# Patient Record
Sex: FEMALE | Race: WHITE | NOT HISPANIC OR LATINO | Employment: UNEMPLOYED | URBAN - METROPOLITAN AREA
[De-identification: names, ages, dates, MRNs, and addresses within clinical notes are randomized per-mention and may not be internally consistent; named-entity substitution may affect disease eponyms.]

---

## 2017-04-21 ENCOUNTER — HOSPITAL ENCOUNTER (OUTPATIENT)
Dept: RADIOLOGY | Facility: CLINIC | Age: 47
Discharge: HOME | End: 2017-04-21
Payer: COMMERCIAL

## 2017-04-21 ENCOUNTER — TRANSCRIBE ORDERS (OUTPATIENT)
Dept: RADIOLOGY | Facility: CLINIC | Age: 47
End: 2017-04-21

## 2017-04-21 DIAGNOSIS — M25.562 LEFT KNEE PAIN, UNSPECIFIED CHRONICITY: Primary | ICD-10-CM

## 2017-04-21 PROCEDURE — 73564 X-RAY EXAM KNEE 4 OR MORE: CPT

## 2017-04-28 ENCOUNTER — HOSPITAL ENCOUNTER (EMERGENCY)
Facility: HOSPITAL | Age: 47
Discharge: HOME | End: 2017-04-28
Attending: EMERGENCY MEDICINE | Admitting: EMERGENCY MEDICINE
Payer: COMMERCIAL

## 2017-04-28 VITALS
DIASTOLIC BLOOD PRESSURE: 83 MMHG | OXYGEN SATURATION: 98 % | BODY MASS INDEX: 27.32 KG/M2 | TEMPERATURE: 99.1 F | WEIGHT: 170 LBS | SYSTOLIC BLOOD PRESSURE: 140 MMHG | RESPIRATION RATE: 20 BRPM | HEIGHT: 66 IN | HEART RATE: 90 BPM

## 2017-04-28 DIAGNOSIS — M23.92 INTERNAL DERANGEMENT OF KNEE, LEFT: Primary | ICD-10-CM

## 2017-04-28 PROCEDURE — 99283 EMERGENCY DEPT VISIT LOW MDM: CPT

## 2017-04-28 RX ORDER — HYDROCODONE BITARTRATE AND ACETAMINOPHEN 5; 325 MG/1; MG/1
1 TABLET ORAL EVERY 6 HOURS PRN
Qty: 20 TABLET | Refills: 0 | Status: SHIPPED | OUTPATIENT
Start: 2017-04-28

## 2017-05-02 ENCOUNTER — HOSPITAL ENCOUNTER (OUTPATIENT)
Dept: MRI IMAGING | Facility: HOSPITAL | Age: 47
Discharge: HOME | End: 2017-05-02
Payer: COMMERCIAL

## 2017-05-02 DIAGNOSIS — M25.562 LEFT KNEE PAIN, UNSPECIFIED CHRONICITY: ICD-10-CM

## 2017-05-02 PROCEDURE — 73721 MRI JNT OF LWR EXTRE W/O DYE: CPT

## 2018-07-20 ENCOUNTER — TRANSCRIBE ORDERS (OUTPATIENT)
Dept: ADMINISTRATIVE | Facility: HOSPITAL | Age: 48
End: 2018-07-20

## 2018-07-20 DIAGNOSIS — Z12.39 SCREENING BREAST EXAMINATION: Primary | ICD-10-CM

## 2018-07-24 ENCOUNTER — HOSPITAL ENCOUNTER (OUTPATIENT)
Dept: BONE DENSITY | Facility: IMAGING CENTER | Age: 48
Discharge: HOME | End: 2018-07-24
Payer: COMMERCIAL

## 2018-07-24 DIAGNOSIS — Z12.39 SCREENING BREAST EXAMINATION: ICD-10-CM

## 2018-07-24 PROCEDURE — 77067 SCR MAMMO BI INCL CAD: CPT

## 2019-07-09 ENCOUNTER — TRANSCRIBE ORDERS (OUTPATIENT)
Dept: ADMINISTRATIVE | Facility: HOSPITAL | Age: 49
End: 2019-07-09

## 2019-07-09 DIAGNOSIS — Z12.39 BREAST SCREENING: Primary | ICD-10-CM

## 2019-09-16 ENCOUNTER — HOSPITAL ENCOUNTER (OUTPATIENT)
Dept: BONE DENSITY | Facility: IMAGING CENTER | Age: 49
Discharge: HOME | End: 2019-09-16
Payer: COMMERCIAL

## 2019-09-16 VITALS — WEIGHT: 172 LBS | BODY MASS INDEX: 27 KG/M2 | HEIGHT: 67 IN

## 2019-09-16 DIAGNOSIS — Z12.39 BREAST SCREENING: ICD-10-CM

## 2019-09-16 PROCEDURE — 77067 SCR MAMMO BI INCL CAD: CPT

## 2019-09-16 PROCEDURE — 77063 BREAST TOMOSYNTHESIS BI: CPT

## 2021-07-21 ENCOUNTER — TELEPHONE (OUTPATIENT)
Dept: OBGYN CLINIC | Facility: CLINIC | Age: 51
End: 2021-07-21

## 2022-08-10 ENCOUNTER — HOSPITAL ENCOUNTER (OUTPATIENT)
Dept: MAMMOGRAPHY | Facility: IMAGING CENTER | Age: 52
Discharge: HOME | End: 2022-08-10
Payer: COMMERCIAL

## 2022-08-10 VITALS — WEIGHT: 178 LBS | HEIGHT: 67 IN | BODY MASS INDEX: 27.94 KG/M2

## 2022-08-10 DIAGNOSIS — Z12.31 ENCOUNTER FOR SCREENING MAMMOGRAM FOR MALIGNANT NEOPLASM OF BREAST: ICD-10-CM

## 2022-08-10 PROCEDURE — 77067 SCR MAMMO BI INCL CAD: CPT

## 2022-08-10 PROCEDURE — 77063 BREAST TOMOSYNTHESIS BI: CPT

## 2024-02-26 ENCOUNTER — EVALUATION (OUTPATIENT)
Dept: PHYSICAL THERAPY | Facility: CLINIC | Age: 54
End: 2024-02-26
Payer: OTHER MISCELLANEOUS

## 2024-02-26 DIAGNOSIS — G89.29 CHRONIC LEFT SHOULDER PAIN: Primary | ICD-10-CM

## 2024-02-26 DIAGNOSIS — M25.512 CHRONIC LEFT SHOULDER PAIN: Primary | ICD-10-CM

## 2024-02-26 PROCEDURE — 97161 PT EVAL LOW COMPLEX 20 MIN: CPT | Performed by: PHYSICAL THERAPIST

## 2024-02-26 PROCEDURE — 97530 THERAPEUTIC ACTIVITIES: CPT | Performed by: PHYSICAL THERAPIST

## 2024-02-26 PROCEDURE — 97112 NEUROMUSCULAR REEDUCATION: CPT | Performed by: PHYSICAL THERAPIST

## 2024-02-26 NOTE — PROGRESS NOTES
PT Evaluation     Today's date: 2024  Patient name: Claude Priest  : 1970  MRN: [id number]  Referring provider: Johnson Jason MD  Dx:   Encounter Diagnosis     ICD-10-CM    1. Chronic left shoulder pain  M25.512     G89.29                      Assessment  Assessment details: Claude Priest is a 54 y.o. female presenting to outpatient physical therapy at [facility] with complaints of L shoulder pain, stiffness and weakness.  She presents with decreased L shoulder range of motion, decreased strength, limited flexibility, poor postural awareness, poor body mechanics, decreased tolerance to activity and decreased functional mobility due to Chronic left shoulder pain  (primary encounter diagnosis).  She would benefit from skilled PT services in order to address these deficits and reach maximum level of function.  Thank you for the referral!    Impairments: abnormal or restricted ROM, activity intolerance, impaired physical strength and pain with function    Symptom irritability: moderateUnderstanding of Dx/Px/POC: good   Prognosis: good    Goals  STG  1. Independent with HEP in 2 weeks  2. Decrease pain at worst by 50% in 2 weeks    LTG  1. Increase L shoulder strength in all planes to 5/5 in 4 weeks  2. 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   Patient Goals  Patient goals for therapy: return to work, independence with ADLs/IADLs, increased strength, increased motion and decreased  "pain    Pain  Current pain ratin  At best pain ratin  At worst pain ratin  Location: L lateral delt  Quality: dull ache and sharp  Relieving factors: ice  Aggravating factors: overhead activity and lifting  Progression: worsening      Diagnostic Tests  X-ray: abnormal  MRI studies: abnormal      Objective     Active Range of Motion   Left Shoulder   Flexion: 135 degrees with pain  Extension: 35 degrees with pain  Abduction: 160 degrees   External rotation 0°: 40 degrees with pain  External rotation BTH: C7   Internal rotation BTB: T12 with pain    Right Shoulder   Normal active range of motion    Strength/Myotome Testing     Left Shoulder     Planes of Motion   Flexion: 4+   Extension: 4+   Abduction: 4+   External rotation at 45°: 4   Internal rotation at 0°: 4+   Horizontal abduction: 4-     Isolated Muscles   Lower trapezius: 4-   Middle trapezius: 4-     Right Shoulder   Normal muscle strength    Tests     Left Shoulder   Positive Hawkin's and passive horizontal adduction.          Precautions: L shoulder RTC surgery will be scheduled for                                                                 Neuro Re-Ed             Scapular retraction iso 10x10\"            Mid row iso 30x 5\" green TB            Shld ext iso 30x 5\" green TB            Standing Trio              Sidelying Trio                                       Ther Ex             UBE W/U                                                                                                        Ther Activity             Pt education: pathoanatomy, nature of sxs, POC, HEP NS 8'                         Gait Training                                       Modalities                                            "

## 2024-02-26 NOTE — LETTER
2024    Johnson Jason MD  [address] [place] [id number]    Patient: Claude Priest   YOB: 1970   Date of Visit: 2024     Encounter Diagnosis     ICD-10-CM    1. Chronic left shoulder pain  M25.512     G89.29           Dear Dr. Jason:    Thank you for your recent referral of Claude Priest. Please review the attached evaluation summary from Claude's recent visit.     Please verify that you agree with the plan of care by signing the attached order.     If you have any questions or concerns, please do not hesitate to call.     I sincerely appreciate the opportunity to share in the care of one of your patients and hope to have another opportunity to work with you in the near future.       Sincerely,    Saravanan Jaramillo, PT      Referring Provider:      I certify that I have read the below Plan of Care and certify the need for these services furnished under this plan of treatment while under my care.                    Johnson Jason MD  [address] [place] [address]  Via Fax: [phone]          PT Evaluation     Today's date: 2024  Patient name: Claude Priest  : 1970  MRN: [id number]  Referring provider: Johnson Jason MD  Dx:   Encounter Diagnosis     ICD-10-CM    1. Chronic left shoulder pain  M25.512     G89.29                      Assessment  Assessment details: Claude Priest is a 54 y.o. female presenting to outpatient physical therapy at [facility] with complaints of L shoulder pain, stiffness and weakness.  She presents with decreased L shoulder range of motion, decreased strength, limited flexibility, poor postural awareness, poor body mechanics, decreased tolerance to activity and decreased functional mobility due to Chronic left shoulder pain  (primary encounter diagnosis).  She would benefit from skilled PT services in order to address these deficits and reach maximum level of function.  Thank you for the  referral!    Impairments: abnormal or restricted ROM, activity intolerance, impaired physical strength and pain with function    Symptom irritability: moderateUnderstanding of Dx/Px/POC: good   Prognosis: good    Goals  STG  1. Independent with HEP in 2 weeks  2. Decrease pain at worst by 50% in 2 weeks    LTG  1. Increase L shoulder strength in all planes to 5/5 in 4 weeks  2. 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   Patient Goals  Patient goals for therapy: return to work, independence with ADLs/IADLs, increased strength, increased motion and decreased pain    Pain  Current pain ratin  At best pain ratin  At worst pain ratin  Location: L lateral delt  Quality: dull ache and sharp  Relieving factors: ice  Aggravating factors: overhead activity and lifting  Progression: worsening      Diagnostic Tests  X-ray: abnormal  MRI studies: abnormal      Objective     Active Range of Motion   Left Shoulder   Flexion: 135 degrees with pain  Extension: 35 degrees with pain  Abduction: 160 degrees   External rotation 0°: 40 degrees with pain  External rotation BTH: C7   Internal rotation BTB: T12 with pain    Right Shoulder   Normal active range of motion    Strength/Myotome Testing     Left Shoulder     Planes of Motion   Flexion: 4+   Extension: 4+   Abduction: 4+   External rotation at 45°: 4   Internal rotation at 0°: 4+   Horizontal abduction: 4-     Isolated Muscles   Lower trapezius: 4-   Middle trapezius:  "4-     Right Shoulder   Normal muscle strength    Tests     Left Shoulder   Positive Hawkin's and passive horizontal adduction.          Precautions: L shoulder RTC surgery will be scheduled for April Manuals 2/26                                                                Neuro Re-Ed             Scapular retraction iso 10x10\"            Mid row iso 30x 5\" green TB            Shld ext iso 30x 5\" green TB            Standing Trio              Sidelying Trio                                       Ther Ex             UBE W/U                                                                                                        Ther Activity             Pt education: pathoanatomy, nature of sxs, POC, HEP NS 8'                         Gait Training                                       Modalities                                                             "

## 2024-03-11 ENCOUNTER — OFFICE VISIT (OUTPATIENT)
Dept: PHYSICAL THERAPY | Facility: CLINIC | Age: 54
End: 2024-03-11
Payer: OTHER MISCELLANEOUS

## 2024-03-11 DIAGNOSIS — M25.512 CHRONIC LEFT SHOULDER PAIN: Primary | ICD-10-CM

## 2024-03-11 DIAGNOSIS — G89.29 CHRONIC LEFT SHOULDER PAIN: Primary | ICD-10-CM

## 2024-03-11 PROCEDURE — 97110 THERAPEUTIC EXERCISES: CPT

## 2024-03-11 PROCEDURE — 97112 NEUROMUSCULAR REEDUCATION: CPT

## 2024-03-11 NOTE — PROGRESS NOTES
"Daily Note     Today's date: 3/11/2024  Patient name: Claude Priest  : 1970  MRN: [id number]  Referring provider: Johnson Jason MD  Dx:   Encounter Diagnosis     ICD-10-CM    1. Chronic left shoulder pain  M25.512     G89.29           Start Time: 1700  Stop Time: 1740  Total time in clinic (min): 40 minutes    Subjective: Patient brought in her CD with MRI images on them for PT to look at today. No new complaints.      Objective: See treatment diary below      Assessment: Tolerated treatment well.  Session focused on preparation for surgery in April. Patient did well with additional TE's today without an increase to symptoms. Patient has some min/tolerable discomfort to anterior shoulder during standing and supine flexion .Trial thoracic stretch however had min relief as noted by pt. Pt advised to download MRI results and email to primary PT. Patient was fatigued post treatment and would benefit from continued PT to improve ROM, strength and flexibility to optimize return to prior functional mobility.        Plan: Continue per plan of care.      Precautions: L shoulder RTC surgery will be scheduled for April       Manuals 2/26 3/11                                                               Neuro Re-Ed             Scapular retraction iso 10x10\" 10\" x10           Mid row iso 30x 5\" green TB 30x 5\" green  TB           Shld ext iso 30x 5\" green TB 30x 5\" green TB           Standing Trio   20x ea           Sidelying Trio  20x ea                          ABC supine  1x                                     Ther Ex             UBE W/U  3/3           UT/LV Stretch   20\"x3           Thoracic stretch     3\"x10                                                                            Ther Activity             Pt education: pathoanatomy, nature of sxs, POC, HEP NS 8'                         Gait Training                                       Modalities                                            "

## 2024-03-27 ENCOUNTER — OFFICE VISIT (OUTPATIENT)
Dept: PHYSICAL THERAPY | Facility: CLINIC | Age: 54
End: 2024-03-27
Payer: OTHER MISCELLANEOUS

## 2024-03-27 DIAGNOSIS — M25.512 CHRONIC LEFT SHOULDER PAIN: Primary | ICD-10-CM

## 2024-03-27 DIAGNOSIS — G89.29 CHRONIC LEFT SHOULDER PAIN: Primary | ICD-10-CM

## 2024-03-27 PROCEDURE — 97112 NEUROMUSCULAR REEDUCATION: CPT

## 2024-03-27 PROCEDURE — 97110 THERAPEUTIC EXERCISES: CPT

## 2024-03-27 NOTE — PROGRESS NOTES
"Daily Note     Today's date: 3/27/2024  Patient name: Claude Priest  : 1970  MRN: [id number]  Referring provider: Johnson Jason MD  Dx:   Encounter Diagnosis     ICD-10-CM    1. Chronic left shoulder pain  M25.512     G89.29                      Subjective: Pt reports she is doing ok, still has pain. She has surgery scheduled for the end of April.      Objective: See treatment diary below      Assessment: Tolerated treatment well. Pt performed all exercises without issue, continue to progress to tolerance. Pt challenged by abd AROM. Pt would benefit from focus on stretching and ROM exercises to decrease stiffness and increase overall function. Patient demonstrated fatigue post treatment, exhibited good technique with therapeutic exercises, and would benefit from continued PT      Plan: Continue per plan of care. Pt to continue therapy after surgery in April, will call to schedule.      Precautions: L shoulder RTC surgery will be scheduled for April       Manuals 2/26 3/11 3/27                                                              Neuro Re-Ed             Scapular retraction iso 10x10\" 10\" x10 10x10\"          Mid row iso 30x 5\" green TB 30x 5\" green  TB 30x5\" GTB          Shld ext iso 30x 5\" green TB 30x 5\" green TB 30x5\" green TB          Standing Trio   20x ea 20x ea          Sidelying Trio  20x ea 20x ea                         ABC supine  1x 1x                                    Ther Ex             UBE W/U  3/3 3/3          UT/LV Stretch   20\"x3 20\"x3          Thoracic stretch     3\"x10 3\"x10                                                                           Ther Activity             Pt education: pathoanatomy, nature of sxs, POC, HEP NS 8'                         Gait Training                                       Modalities                                              "

## 2024-05-22 ENCOUNTER — EVALUATION (OUTPATIENT)
Dept: PHYSICAL THERAPY | Facility: CLINIC | Age: 54
End: 2024-05-22
Payer: OTHER MISCELLANEOUS

## 2024-05-22 DIAGNOSIS — G89.29 CHRONIC LEFT SHOULDER PAIN: Primary | ICD-10-CM

## 2024-05-22 DIAGNOSIS — M25.512 CHRONIC LEFT SHOULDER PAIN: Primary | ICD-10-CM

## 2024-05-22 PROCEDURE — 97530 THERAPEUTIC ACTIVITIES: CPT | Performed by: PHYSICAL THERAPIST

## 2024-05-22 PROCEDURE — 97164 PT RE-EVAL EST PLAN CARE: CPT | Performed by: PHYSICAL THERAPIST

## 2024-05-22 PROCEDURE — 97140 MANUAL THERAPY 1/> REGIONS: CPT | Performed by: PHYSICAL THERAPIST

## 2024-05-22 NOTE — LETTER
May 22, 2024    Johnson Jason MD  [address] [place] [id number]    Patient: Claude Priest   YOB: 1970   Date of Visit: 2024     Encounter Diagnosis     ICD-10-CM    1. Chronic left shoulder pain  M25.512     G89.29           Dear Dr. Jason:    Thank you for your recent referral of Claude Priest. Please review the attached evaluation summary from Claude's recent visit.     Please verify that you agree with the plan of care by signing the attached order.     If you have any questions or concerns, please do not hesitate to call.     I sincerely appreciate the opportunity to share in the care of one of your patients and hope to have another opportunity to work with you in the near future.       Sincerely,    Saravanan Jaramillo, PT      Referring Provider:      I certify that I have read the below Plan of Care and certify the need for these services furnished under this plan of treatment while under my care.                    Johnson Jason MD  [address] [place] [address]  Via Fax: [phone]          PT Re-Evaluation     Today's date: 2024  Patient name: Claude Priest  : 1970  MRN: [id number]  Referring provider: Johnson Jason MD  Dx:   Encounter Diagnosis     ICD-10-CM    1. Chronic left shoulder pain  M25.512     G89.29           Assessment  Assessment details: Claude Priest is a 54 y.o. female presenting to outpatient physical therapy at [facility] with complaints of L shoulder pain, stiffness and weakness.  She presents with decreased L shoulder range of motion, decreased strength, limited flexibility, poor postural awareness, poor body mechanics, decreased tolerance to activity and decreased functional mobility due to Chronic left shoulder pain  (primary encounter diagnosis).  She would benefit from skilled PT services in order to address these deficits and reach maximum level of function.  Thank you for the referral!    Impairments:  abnormal or restricted ROM, activity intolerance, impaired physical strength and pain with function    Symptom irritability: moderateUnderstanding of Dx/Px/POC: good   Prognosis: good    Goals  STG  1. Independent with HEP in 2 weeks  2. Decrease pain at worst by 50% in 2 weeks    LTG  1. Increase L shoulder strength in all planes to 5/5 in 4 weeks  2. 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   Patient Goals  Patient goals for therapy: return to work, independence with ADLs/IADLs, increased strength, increased motion and decreased pain    Pain  Current pain ratin  At best pain ratin  At worst pain ratin       Location: L lateral delt  Quality: dull ache and sharp  Relieving factors: ice  Aggravating factors: overhead activity and lifting  Progression: worsening      Diagnostic Tests  X-ray: abnormal  MRI studies: abnormal      Objective     Active Range of Motion    - TBA  Left Shoulder   Flexion:   Extension:   Abduction:   External rotation 0°:   External rotation BTH:   Internal rotation BTB:     Right Shoulder   Normal active range of motion    Strength/Myotome Testing     Left Shoulder     Planes of Motion   Flexion: 3+  Extension: 3+   Abduction: 3+   External rotation at 45°: 3-   Internal rotation at 0°: 3+   Horizontal abduction: 3-     Isolated Muscles   Lower trapezius: 3  Middle trapezius: 3     Right Shoulder   Normal muscle strength      Precautions: L shoulder RTC & biceps repair  "4/23/2024   EPOC: 7/22/2024    Manuals 5/22            L shoulder PROM NS            L shoulder STM                                       Neuro Re-Ed             Scapular retraction iso 10x10\"            Mid row iso             Shld ext iso             Standing Trio              Sidelying Trio                                       Ther Ex             UBE W/U             pendulum 3x1'            Elbow flex/ext 10x10\"            Wrist flx/ext 10x10\"            UT shrug/rolls/SB 10x10\" ea            Forearm supination/pronation 10x10\"            pulleys 3'                         Ther Activity             Pt education: pathoanatomy, nature of sxs, POC, HEP NS 8'                         Gait Training                                       Modalities                                                          "

## 2024-05-22 NOTE — PROGRESS NOTES
PT Re-Evaluation     Today's date: 2024  Patient name: Claude Priest  : 1970  MRN: [id number]  Referring provider: Johnson Jason MD  Dx:   Encounter Diagnosis     ICD-10-CM    1. Chronic left shoulder pain  M25.512     G89.29           Assessment  Assessment details: Claude Priest is a 54 y.o. female presenting to outpatient physical therapy at [facility] with complaints of L shoulder pain, stiffness and weakness.  She presents with decreased L shoulder range of motion, decreased strength, limited flexibility, poor postural awareness, poor body mechanics, decreased tolerance to activity and decreased functional mobility due to Chronic left shoulder pain  (primary encounter diagnosis).  She would benefit from skilled PT services in order to address these deficits and reach maximum level of function.  Thank you for the referral!    Impairments: abnormal or restricted ROM, activity intolerance, impaired physical strength and pain with function    Symptom irritability: moderateUnderstanding of Dx/Px/POC: good   Prognosis: good    Goals  STG  1. Independent with HEP in 2 weeks  2. Decrease pain at worst by 50% in 2 weeks    LTG  1. Increase L shoulder strength in all planes to 5/5 in 4 weeks  2. 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   Patient Goals  Patient goals for therapy: return to work, independence with ADLs/IADLs, increased strength, increased motion and decreased pain    Pain  Current  "pain ratin  At best pain ratin  At worst pain ratin       Location: L lateral delt  Quality: dull ache and sharp  Relieving factors: ice  Aggravating factors: overhead activity and lifting  Progression: worsening      Diagnostic Tests  X-ray: abnormal  MRI studies: abnormal      Objective     Active Range of Motion    - TBA  Left Shoulder   Flexion:   Extension:   Abduction:   External rotation 0°:   External rotation BTH:   Internal rotation BTB:     Right Shoulder   Normal active range of motion    Strength/Myotome Testing     Left Shoulder     Planes of Motion   Flexion: 3+  Extension: 3+   Abduction: 3+   External rotation at 45°: 3-   Internal rotation at 0°: 3+   Horizontal abduction: 3-     Isolated Muscles   Lower trapezius: 3  Middle trapezius: 3     Right Shoulder   Normal muscle strength      Precautions: L shoulder RTC & biceps repair 2024   EPOC: 2024    Manuals             L shoulder PROM NS            L shoulder STM                                       Neuro Re-Ed             Scapular retraction iso 10x10\"            Mid row iso             Shld ext iso             Standing Trio              Sidelying Trio                                       Ther Ex             UBE W/U             pendulum 3x1'            Elbow flex/ext 10x10\"            Wrist flx/ext 10x10\"            UT shrug/rolls/SB 10x10\" ea            Forearm supination/pronation 10x10\"            pulleys 3'                         Ther Activity             Pt education: pathoanatomy, nature of sxs, POC, HEP NS 8'                         Gait Training                                       Modalities                                          "

## 2024-05-31 ENCOUNTER — OFFICE VISIT (OUTPATIENT)
Dept: PHYSICAL THERAPY | Facility: CLINIC | Age: 54
End: 2024-05-31
Payer: OTHER MISCELLANEOUS

## 2024-05-31 DIAGNOSIS — G89.29 CHRONIC LEFT SHOULDER PAIN: Primary | ICD-10-CM

## 2024-05-31 DIAGNOSIS — M25.512 CHRONIC LEFT SHOULDER PAIN: Primary | ICD-10-CM

## 2024-05-31 PROCEDURE — 97112 NEUROMUSCULAR REEDUCATION: CPT | Performed by: PHYSICAL THERAPIST

## 2024-05-31 PROCEDURE — 97110 THERAPEUTIC EXERCISES: CPT | Performed by: PHYSICAL THERAPIST

## 2024-05-31 PROCEDURE — 97140 MANUAL THERAPY 1/> REGIONS: CPT | Performed by: PHYSICAL THERAPIST

## 2024-05-31 NOTE — PROGRESS NOTES
"Daily Note     Today's date: 2024  Patient name: Claude Priest  : 1970  MRN: [id number]  Referring provider: Johnson Jason MD  Dx:   Encounter Diagnosis     ICD-10-CM    1. Chronic left shoulder pain  M25.512     G89.29           Subjective: Compliant with HEP, no questions regarding POC, motivated to continue PT      Objective: See treatment diary below      Assessment: Tolerated treatment well. Patient demonstrated fatigue post treatment, exhibited good technique with therapeutic exercises, and would benefit from continued PT      Plan: Progress treatment as tolerated.   Progress treament per protocol.          Precautions: L shoulder RTC & biceps repair 2024     EPOC: 2024    Manuals             L shoulder PROM NS            L shoulder STM                                       Neuro Re-Ed             Scapular retraction iso 20x5\"            Mid row iso Prone 20x 5\"            Shld ext iso Prone 20x 5\"            Standing Trio              Sidelying Trio ER 20x 5\"                                      Ther Ex                          OH Wall walks 10x5\"             Elbow flex/ext 30x5\"            Wrist flx/ext 20x5\"            UT shrug/rolls/SB 10x10\" ea            Forearm supination/pronation 20x5\"            pulleys 3'            UT stretch 10x10\" LUT            Ther Activity             Pt education: pathoanatomy, nature of sxs, POC, HEP NS 8'            UBE for shoulder ROM             Gait Training                                       Modalities                                         "

## 2024-06-05 ENCOUNTER — OFFICE VISIT (OUTPATIENT)
Dept: PHYSICAL THERAPY | Facility: CLINIC | Age: 54
End: 2024-06-05
Payer: OTHER MISCELLANEOUS

## 2024-06-05 DIAGNOSIS — M25.512 CHRONIC LEFT SHOULDER PAIN: Primary | ICD-10-CM

## 2024-06-05 DIAGNOSIS — G89.29 CHRONIC LEFT SHOULDER PAIN: Primary | ICD-10-CM

## 2024-06-05 PROCEDURE — 97112 NEUROMUSCULAR REEDUCATION: CPT | Performed by: PHYSICAL THERAPIST

## 2024-06-05 PROCEDURE — 97110 THERAPEUTIC EXERCISES: CPT | Performed by: PHYSICAL THERAPIST

## 2024-06-05 NOTE — PROGRESS NOTES
"Daily Note     Today's date: 2024  Patient name: Claude Priest  : 1970  MRN: [id number]  Referring provider: Johnson Jason MD  Dx:   Encounter Diagnosis     ICD-10-CM    1. Chronic left shoulder pain  M25.512     G89.29           Subjective: Compliant with HEP, no questions regarding POC, motivated to continue PT      Objective: See treatment diary below      Assessment: Tolerated treatment well. with progression of treatment program as noted below requiring verbal and tactile cues from PT for safe execution of therapeutic exercise, however pt's full, pain-free L shoulder mobility & stability remain limited at this time, secondary to surgical repair, contributing to functional deficits. Patient demonstrated fatigue post treatment, exhibited good technique with therapeutic exercises, and would benefit from continued PT      Plan: Progress treatment as tolerated.   Progress treament per protocol.        Precautions: L shoulder RTC & biceps repair 2024     EPOC: 2024    Manuals 6/5            L shoulder PROM             L shoulder STM                                       Neuro Re-Ed             Scapular retraction iso 20x5\"            Mid row iso Peach TB x30 5\"            Shld ext iso Peach TB x30 5\"            Standing Trio  20x 5\" ea            Sidelying Trio 30x 5\" ea                                      Ther Ex             Wand Ext & IR 10x10\" ea            OH Wall walks 10x5\"             Elbow flex/ext 30x5\"            Wrist flx/ext 20x5\"            UT shrug/rolls/SB 10x10\" ea            Forearm supination/pronation 20x5\"            pulleys 3'            UT stretch 10x10\" LUT            Ther Activity             Pt education: pathoanatomy, nature of sxs, POC, HEP NS 4'            UBE for shoulder ROM 2'/2' Fwd/bwd L1            Gait Training                                       Modalities                                         "

## 2024-06-12 ENCOUNTER — OFFICE VISIT (OUTPATIENT)
Dept: PHYSICAL THERAPY | Facility: CLINIC | Age: 54
End: 2024-06-12
Payer: OTHER MISCELLANEOUS

## 2024-06-12 DIAGNOSIS — M25.512 CHRONIC LEFT SHOULDER PAIN: Primary | ICD-10-CM

## 2024-06-12 DIAGNOSIS — G89.29 CHRONIC LEFT SHOULDER PAIN: Primary | ICD-10-CM

## 2024-06-12 PROCEDURE — 97140 MANUAL THERAPY 1/> REGIONS: CPT

## 2024-06-12 PROCEDURE — 97110 THERAPEUTIC EXERCISES: CPT

## 2024-06-12 PROCEDURE — 97112 NEUROMUSCULAR REEDUCATION: CPT

## 2024-06-12 NOTE — PROGRESS NOTES
"Daily Note     Today's date: 2024  Patient name: Claude Priest  : 1970  MRN: [id number]  Referring provider: Johnson Jason MD  Dx:   Encounter Diagnosis     ICD-10-CM    1. Chronic left shoulder pain  M25.512     G89.29                      Subjective: Pt reports she feels she may have done the wrong thing at home to increase her shld pain. Reports shaking out the bathroom rug and having increased pain that kept her from sleeping.      Objective: See treatment diary below      Assessment: Tolerated treatment poor due to poor raj for PROM w/ limited ROM. Patient demonstrated fatigue post treatment and would benefit from continued PT for cont'd work on shld ROM before strengthening.  Pt unable to perform any shld ER in SL.  Session was devoted to pt performing ROM ex's and PROM.        Plan: Continue per plan of care.  Progress treatment as tolerated.       Precautions: L shoulder RTC & biceps repair 2024     EPOC: 2024    Manuals            L shoulder PROM  JK           L shoulder STM  JK                                     Neuro Re-Ed             Scapular retraction iso 20x5\" 10x5\"           Mid row iso Peach TB x30 5\" np           Shld ext iso Alexander TB x30 5\" np           Standing Trio  20x 5\" ea np           Sidelying Trio 30x 5\" ea np                                     Ther Ex             Wand  IR 10x10\" ea 10x10\"            OH Wall walks 10x5\"  3xp!           Elbow flex/ext 30x5\"            Wrist flx/ext 20x5\"            UT shrug/rolls/SB 10x10\" ea Circles 10x           Forearm supination/pronation 20x5\"            Wand ER supine  10x10\"           Wand supine Flexion  10x10\"           Stding shld wand flexion  10x10\"                        pulleys 3' 3'           UT stretch 10x10\" LUT            Ther Activity             Pt education: pathoanatomy, nature of sxs, POC, HEP NS 4'            UBE for shoulder ROM 2'/2' Fwd/bwd L1 np           Gait Training                        "                Modalities

## 2024-06-19 ENCOUNTER — OFFICE VISIT (OUTPATIENT)
Dept: PHYSICAL THERAPY | Facility: CLINIC | Age: 54
End: 2024-06-19
Payer: OTHER MISCELLANEOUS

## 2024-06-19 DIAGNOSIS — M25.512 CHRONIC LEFT SHOULDER PAIN: Primary | ICD-10-CM

## 2024-06-19 DIAGNOSIS — G89.29 CHRONIC LEFT SHOULDER PAIN: Primary | ICD-10-CM

## 2024-06-19 PROCEDURE — 97112 NEUROMUSCULAR REEDUCATION: CPT | Performed by: PHYSICAL THERAPIST

## 2024-06-19 PROCEDURE — 97110 THERAPEUTIC EXERCISES: CPT | Performed by: PHYSICAL THERAPIST

## 2024-06-19 PROCEDURE — 97530 THERAPEUTIC ACTIVITIES: CPT | Performed by: PHYSICAL THERAPIST

## 2024-06-19 NOTE — PROGRESS NOTES
"Daily Note     Today's date: 2024  Patient name: Claude Priest  : 1970  MRN: [id number]  Referring provider: Johnson Jason MD  Dx:   Encounter Diagnosis     ICD-10-CM    1. Chronic left shoulder pain  M25.512     G89.29           Subjective: Compliant with HEP, no questions regarding POC, motivated to continue PT      Objective: See treatment diary below      Assessment: Tolerated treatment well. with progression of treatment program as noted below requiring verbal and tactile cues from PT for safe execution of therapeutic exercise, however pt's full, pain-free L shoulder mobility & stability remain limited at this time, secondary to surgical repair, contributing to functional deficits. Patient demonstrated fatigue post treatment, exhibited good technique with therapeutic exercises, and would benefit from continued PT      Plan: Progress treatment as tolerated.   Progress treament per protocol.        Precautions: L shoulder RTC & biceps repair 2024     EPOC: 2024    Manuals             L shoulder PROM             L shoulder STM                                       Neuro Re-Ed             Scapular retraction iso hep            Mid row iso Green TB x30 5\"            Shld ext iso Green TB x30 5\"            Standing Trio  20x 5\" ea 1lb            Sidelying Trio 30x 5\" ea 1lb                                      Ther Ex             Wand Ext & IR & OH flx  10x10\" ea            OH Wall walks 15x5\"             Elbow flex DB curl + press OH 20x 1lb            Wrist flx/ext             UT shrug/rolls/SB             Supine alphabets X2 1lb             pulleys 3'            UT stretch             Ther Activity             Pt education: pathoanatomy, nature of sxs, POC, HEP NS 2'            UBE for shoulder ROM 3'/3' Fwd/bwd L1.5            Gait Training                                       Modalities                                         "

## 2024-06-26 ENCOUNTER — EVALUATION (OUTPATIENT)
Dept: PHYSICAL THERAPY | Facility: CLINIC | Age: 54
End: 2024-06-26
Payer: OTHER MISCELLANEOUS

## 2024-06-26 DIAGNOSIS — M25.512 CHRONIC LEFT SHOULDER PAIN: Primary | ICD-10-CM

## 2024-06-26 DIAGNOSIS — G89.29 CHRONIC LEFT SHOULDER PAIN: Primary | ICD-10-CM

## 2024-06-26 PROCEDURE — 97112 NEUROMUSCULAR REEDUCATION: CPT | Performed by: PHYSICAL THERAPIST

## 2024-06-26 PROCEDURE — 97164 PT RE-EVAL EST PLAN CARE: CPT | Performed by: PHYSICAL THERAPIST

## 2024-06-26 PROCEDURE — 97110 THERAPEUTIC EXERCISES: CPT | Performed by: PHYSICAL THERAPIST

## 2024-06-26 NOTE — PROGRESS NOTES
PT Re-Evaluation     Today's date: 2024  Patient name: Claude Priest  : 1970  MRN: [id number]  Referring provider: Johnson Jason MD  Dx:   Encounter Diagnosis     ICD-10-CM    1. Chronic left shoulder pain  M25.512     G89.29           Assessment  Assessment details: Claude Priest is a 54 y.o. female seen in outpatient physical therapy at [facility] with complaints of L shoulder pain, stiffness and weakness.  She has been treated for decreased L shoulder range of motion, decreased strength, limited flexibility, poor postural awareness, poor body mechanics, decreased tolerance to activity and decreased functional mobility due to Chronic left shoulder pain  (primary encounter diagnosis).  Re-evaluation was performed to assess if she would benefit from skilled PT services in order to address these deficits and reach maximum level of function.  Thank you for the referral!    Impairments: abnormal or restricted ROM, activity intolerance, impaired physical strength and pain with function    Symptom irritability: moderateUnderstanding of Dx/Px/POC: good   Prognosis: good    Goals  STG  1. Independent with HEP in 2 weeks      Goal met  2. Decrease pain at worst by 50% in 2 weeks    Good progress       LTG  1. Increase L shoulder strength in all planes to 5/5 in 4 weeks  Good progress  2. 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   Patient  "Goals  Patient goals for therapy: return to work, independence with ADLs/IADLs, increased strength, increased motion and decreased pain    Pain  Current pain ratin  At best pain ratin  At worst pain ratin      5/10      Location: L lateral delt  Quality: dull ache and sharp  Relieving factors: ice  Aggravating factors: overhead activity and lifting  Progression: worsening      Diagnostic Tests  X-ray: abnormal  MRI studies: abnormal      Objective     Active Range of Motion    -  Left Shoulder   Flexion:      105 degrees - slight discomfort   Extension:      60 degrees  Abduction:      90 degrees  External rotation 0°:     45 degrees  External rotation BTH:    L2-3  Internal rotation BTB:     C4-5    Right Shoulder   Normal active range of motion    Strength/Myotome Testing     Left Shoulder     Planes of Motion   Flexion: 3+    4-  Extension: 3+     4  Abduction: 3+     4-  External rotation at 45°: 3-   4-  Internal rotation at 0°: 3+   4  Horizontal abduction: 3-   4-    Isolated Muscles   Lower trapezius: 3   4-  Middle trapezius: 3    4-    Right Shoulder   Normal muscle strength    Precautions: L shoulder RTC & biceps repair 2024     EPOC: 2024      Manuals             L shoulder PROM             L shoulder STM                                       Neuro Re-Ed             Scapular retraction iso hep            Mid row iso purple TB x30 5\"            Shld ext iso blue TB x30 5\"            Standing Trio  20x 5\" ea 1lb            Sidelying Trio 30x 5\" ea 1lb                                      Ther Ex             Wand Ext & IR & OH flx  10x10\" ea 2lb            OH Wall walks 15x5\"             Elbow flex DB curl + press OH 20x 1lb            Shld flx OH TB X30 BTB            UT shrug/rolls/SB             Supine alphabets X2 1lb             pulleys 3'            UT stretch             Ther Activity             Pt education: pathoanatomy, nature of sxs, POC, HEP NS 2'            UBE for " shoulder ROM 3'/3' Fwd/bwd L2            Gait Training                                       Modalities

## 2024-06-26 NOTE — LETTER
2024    Johnson Jason MD  [address] [place] [id number]    Patient: Claude Priest   YOB: 1970   Date of Visit: 2024     Encounter Diagnosis     ICD-10-CM    1. Chronic left shoulder pain  M25.512     G89.29           Dear Dr. Jason:    Thank you for your recent referral of Claude Priest. Please review the attached evaluation summary from Claude's recent visit.     Please verify that you agree with the plan of care by signing the attached order.     If you have any questions or concerns, please do not hesitate to call.     I sincerely appreciate the opportunity to share in the care of one of your patients and hope to have another opportunity to work with you in the near future.       Sincerely,    Saravanan Jaramillo, PT      Referring Provider:      I certify that I have read the below Plan of Care and certify the need for these services furnished under this plan of treatment while under my care.                    Johnson Jason MD  [address] [place] [address]  Via Fax: [phone]          PT Re-Evaluation     Today's date: 2024  Patient name: Claude Priest  : 1970  MRN: [id number]  Referring provider: Johnson Jason MD  Dx:   Encounter Diagnosis     ICD-10-CM    1. Chronic left shoulder pain  M25.512     G89.29           Assessment  Assessment details: Claude Priest is a 54 y.o. female seen in outpatient physical therapy at [facility] with complaints of L shoulder pain, stiffness and weakness.  She has been treated for decreased L shoulder range of motion, decreased strength, limited flexibility, poor postural awareness, poor body mechanics, decreased tolerance to activity and decreased functional mobility due to Chronic left shoulder pain  (primary encounter diagnosis).  Re-evaluation was performed to assess if she would benefit from skilled PT services in order to address these deficits and reach maximum level of function.  Thank  you for the referral!    Impairments: abnormal or restricted ROM, activity intolerance, impaired physical strength and pain with function    Symptom irritability: moderateUnderstanding of Dx/Px/POC: good   Prognosis: good    Goals  STG  1. Independent with HEP in 2 weeks      Goal met  2. Decrease pain at worst by 50% in 2 weeks    Good progress       LTG  1. Increase L shoulder strength in all planes to 5/5 in 4 weeks  Good progress  2. 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   Patient Goals  Patient goals for therapy: return to work, independence with ADLs/IADLs, increased strength, increased motion and decreased pain    Pain  Current pain ratin  At best pain ratin  At worst pain ratin      5/10      Location: L lateral delt  Quality: dull ache and sharp  Relieving factors: ice  Aggravating factors: overhead activity and lifting  Progression: worsening      Diagnostic Tests  X-ray: abnormal  MRI studies: abnormal      Objective     Active Range of Motion    -  Left Shoulder   Flexion:      105 degrees - slight discomfort   Extension:      60 degrees  Abduction:      90 degrees  External rotation 0°:     45 degrees  External rotation BTH:    L2-3  Internal rotation BTB:     C4-5    Right Shoulder   Normal active range of motion    Strength/Myotome Testing     Left Shoulder     Planes of Motion   Flexion: 3+    4-  Extension: 3+  "    4  Abduction: 3+     4-  External rotation at 45°: 3-   4-  Internal rotation at 0°: 3+   4  Horizontal abduction: 3-   4-    Isolated Muscles   Lower trapezius: 3   4-  Middle trapezius: 3    4-    Right Shoulder   Normal muscle strength    Precautions: L shoulder RTC & biceps repair 4/23/2024     EPOC: 7/26/2024      Manuals 6/26            L shoulder PROM             L shoulder STM                                       Neuro Re-Ed             Scapular retraction iso hep            Mid row iso purple TB x30 5\"            Shld ext iso blue TB x30 5\"            Standing Trio  20x 5\" ea 1lb            Sidelying Trio 30x 5\" ea 1lb                                      Ther Ex             Wand Ext & IR & OH flx  10x10\" ea 2lb            OH Wall walks 15x5\"             Elbow flex DB curl + press OH 20x 1lb            Shld flx OH TB X30 BTB            UT shrug/rolls/SB             Supine alphabets X2 1lb             pulleys 3'            UT stretch             Ther Activity             Pt education: pathoanatomy, nature of sxs, POC, HEP NS 2'            UBE for shoulder ROM 3'/3' Fwd/bwd L2            Gait Training                                       Modalities                                                         "

## 2024-07-03 ENCOUNTER — APPOINTMENT (OUTPATIENT)
Dept: PHYSICAL THERAPY | Facility: CLINIC | Age: 54
End: 2024-07-03
Payer: OTHER MISCELLANEOUS

## 2024-07-08 ENCOUNTER — OFFICE VISIT (OUTPATIENT)
Dept: PHYSICAL THERAPY | Facility: CLINIC | Age: 54
End: 2024-07-08
Payer: OTHER MISCELLANEOUS

## 2024-07-08 DIAGNOSIS — M25.512 CHRONIC LEFT SHOULDER PAIN: Primary | ICD-10-CM

## 2024-07-08 DIAGNOSIS — G89.29 CHRONIC LEFT SHOULDER PAIN: Primary | ICD-10-CM

## 2024-07-08 PROCEDURE — 97110 THERAPEUTIC EXERCISES: CPT | Performed by: PHYSICAL THERAPIST

## 2024-07-08 PROCEDURE — 97530 THERAPEUTIC ACTIVITIES: CPT | Performed by: PHYSICAL THERAPIST

## 2024-07-08 PROCEDURE — 97112 NEUROMUSCULAR REEDUCATION: CPT | Performed by: PHYSICAL THERAPIST

## 2024-07-08 NOTE — PROGRESS NOTES
"Daily Note     Today's date: 2024  Patient name: Claude Priest  : 1970  MRN: [id number]  Referring provider: Johnson Jason MD  Dx:   Encounter Diagnosis     ICD-10-CM    1. Chronic left shoulder pain  M25.512     G89.29         Subjective: Compliant with HEP, no questions regarding POC, motivated to continue PT      Objective: See treatment diary below      Assessment: Tolerated treatment well with progression of full treatment program as noted below requiring verbal and tactile cues from PT for safe execution of therapeutic exercise. Patient demonstrated fatigue post treatment, exhibited good technique with therapeutic exercises, and would benefit from continued PT      Plan: Progress treatment as tolerated.          Precautions: L shoulder RTC & biceps repair 2024     EPOC: 2024    Manuals             L shoulder PROM             L shoulder STM                                       Neuro Re-Ed             Scapular retraction iso hep            Mid row iso purple TB x30 5\"            Shld ext iso blue TB x30 5\"            Standing Trio  20x 5\" ea 2lb            Sidelying Trio 30x 5\" ea 2lb                                      Ther Ex             Wand Ext & IR & OH flx  10x10\" ea 2lb            OH Wall walks 15x5\"             Elbow flex DB curl + press OH 20x 2lb            Shld flx OH TB X30 BTB            UT shrug/rolls/SB 2lb x20            Supine alphabets X2 1lb             pulleys 3'            UT stretch             Ther Activity             Pt education: pathoanatomy, nature of sxs, POC, HEP NS 2'            UBE for shoulder ROM 3'/3' Fwd/bwd L2            Gait Training                                       Modalities                                         "

## 2024-07-10 ENCOUNTER — OFFICE VISIT (OUTPATIENT)
Dept: PHYSICAL THERAPY | Facility: CLINIC | Age: 54
End: 2024-07-10
Payer: OTHER MISCELLANEOUS

## 2024-07-10 DIAGNOSIS — M25.512 CHRONIC LEFT SHOULDER PAIN: Primary | ICD-10-CM

## 2024-07-10 DIAGNOSIS — G89.29 CHRONIC LEFT SHOULDER PAIN: Primary | ICD-10-CM

## 2024-07-10 PROCEDURE — 97112 NEUROMUSCULAR REEDUCATION: CPT

## 2024-07-10 PROCEDURE — 97140 MANUAL THERAPY 1/> REGIONS: CPT

## 2024-07-10 PROCEDURE — 97110 THERAPEUTIC EXERCISES: CPT

## 2024-07-10 NOTE — PROGRESS NOTES
"Daily Note     Today's date: 7/10/2024  Patient name: Claude Priest  : 1970  MRN: [id number]  Referring provider: Johnson Jason MD  Dx:   Encounter Diagnosis     ICD-10-CM    1. Chronic left shoulder pain  M25.512     G89.29                      Subjective: Pt report she is sore in the shld due to lying on her shld due to LBP.      Objective: See treatment diary below      Assessment: Tolerated treatment fair. Patient demonstrated fatigue post treatment and would benefit from continued PT for cont'd shld strengthening and ROM.  Pt still challenged w/ AROM into OH motion.      Plan: Continue per plan of care.  Progress treatment as tolerated.       Precautions: L shoulder RTC & biceps repair 2024     EPOC: 2024    Manuals 7/8 7/10           L shoulder PROM  JK           L shoulder STM  JK                                     Neuro Re-Ed             Scapular retraction iso hep            Mid row iso purple TB x30 5\" purple TB x30 5\"           Shld ext iso blue TB x30 5\" blue TB x30 5\"           Standing Trio  20x 5\" ea 2lb 20x 5\" ea 2lb           Sidelying Trio 30x 5\" ea 2lb 30x 5\" ea 1-2lb                                     Ther Ex             Wand Ext & IR & OH flx  10x10\" ea 2lb 10x10\" ea 2lb           OH Wall walks 15x5\"             Elbow flex DB curl + press OH 20x 2lb 20x 2lb           Shld flx OH TB X30 BTB X30 BTB           UT shrug/rolls/SB 2lb x20 nv           Supine alphabets X2 1lb  nv           pulleys 3' 3'           UT stretch             Ther Activity             Pt education: pathoanatomy, nature of sxs, POC, HEP NS 2'            UBE for shoulder ROM 3'/3' Fwd/bwd L2 3'/3' Fwd/bwd L2           Gait Training                                       Modalities                                            "

## 2024-07-15 ENCOUNTER — OFFICE VISIT (OUTPATIENT)
Dept: PHYSICAL THERAPY | Facility: CLINIC | Age: 54
End: 2024-07-15
Payer: OTHER MISCELLANEOUS

## 2024-07-15 DIAGNOSIS — G89.29 CHRONIC LEFT SHOULDER PAIN: Primary | ICD-10-CM

## 2024-07-15 DIAGNOSIS — M25.512 CHRONIC LEFT SHOULDER PAIN: Primary | ICD-10-CM

## 2024-07-15 PROCEDURE — 97112 NEUROMUSCULAR REEDUCATION: CPT

## 2024-07-15 PROCEDURE — 97110 THERAPEUTIC EXERCISES: CPT

## 2024-07-15 PROCEDURE — 97140 MANUAL THERAPY 1/> REGIONS: CPT

## 2024-07-15 NOTE — PROGRESS NOTES
"Daily Note     Today's date: 7/15/2024  Patient name: Claude Priest  : 1970  MRN: [id number]  Referring provider: Johnson Jason MD  Dx:   Encounter Diagnosis     ICD-10-CM    1. Chronic left shoulder pain  M25.512     G89.29                      Subjective: Pt offers no new c/o's regarding her shld.      Objective: See treatment diary below      Assessment: Tolerated treatment well. Patient demonstrated fatigue post treatment, exhibited good technique with therapeutic exercises, and would benefit from continued PT for cont'd shld strengthening for good AROM.  Pt slowly making progress w/ AROM OH.      Plan: Continue per plan of care.  Progress treatment as tolerated.       Precautions: L shoulder RTC & biceps repair 2024     EPOC: 2024    Manuals 7/8 7/10 7/15          L shoulder PROM  JK JK          L shoulder STM  JK JK                                    Neuro Re-Ed             Scapular retraction iso hep            Mid row iso purple TB x30 5\" purple TB x30 5\" purple TB x30 5\"          Shld ext iso blue TB x30 5\" blue TB x30 5\" purple TB x30 5\"          Standing Trio  20x 5\" ea 2lb 20x 5\" ea 2lb 20x 5\" ea 2lb          Sidelying Trio 30x 5\" ea 2lb 30x 5\" ea 1-2lb 20x 5\" ea 1-2lb                                    Ther Ex             Wand Ext & IR & OH flx  10x10\" ea 2lb 10x10\" ea 2lb 10x10\" flex 2lb          OH Wall walks 15x5\"   PTB 5x          Elbow flex DB curl + press OH 20x 2lb 20x 2lb 20x 2lb          Shld flx OH TB X30 BTB X30 BTB X30 BTB          UT shrug/rolls/SB 2lb x20 nv 2lb x20          Supine alphabets X2 1lb  nv X2 2 lb          pulleys 3' 3' 3'          UT stretch             Prone row   2lb 20x          Prone ext   2lb 20x          Prone horz abd   1lb 20x                       Ther Activity             Pt education: pathoanatomy, nature of sxs, POC, HEP NS 2'            UBE for shoulder ROM 3'/3' Fwd/bwd L2 3'/3' Fwd/bwd L2 3'/3' Fwd/bwd L2          Gait Training           "                             Modalities

## 2024-07-17 ENCOUNTER — OFFICE VISIT (OUTPATIENT)
Dept: PHYSICAL THERAPY | Facility: CLINIC | Age: 54
End: 2024-07-17
Payer: OTHER MISCELLANEOUS

## 2024-07-17 DIAGNOSIS — M25.512 CHRONIC LEFT SHOULDER PAIN: Primary | ICD-10-CM

## 2024-07-17 DIAGNOSIS — G89.29 CHRONIC LEFT SHOULDER PAIN: Primary | ICD-10-CM

## 2024-07-17 PROCEDURE — 97140 MANUAL THERAPY 1/> REGIONS: CPT | Performed by: PHYSICAL THERAPIST

## 2024-07-17 PROCEDURE — 97112 NEUROMUSCULAR REEDUCATION: CPT | Performed by: PHYSICAL THERAPIST

## 2024-07-17 PROCEDURE — 97110 THERAPEUTIC EXERCISES: CPT | Performed by: PHYSICAL THERAPIST

## 2024-07-17 NOTE — PROGRESS NOTES
"Daily Note     Today's date: 2024  Patient name: Claude Priest  : 1970  MRN: [id number]  Referring provider: Johnson Jason MD  Dx:   Encounter Diagnosis     ICD-10-CM    1. Chronic left shoulder pain  M25.512     G89.29             Subjective: Compliant with HEP, no questions regarding POC, motivated to continue PT      Objective: See treatment diary below      Assessment: Tolerated treatment well, however pt's full, pain-free L shoulder mobility & stability remain limited at this time, secondary to RTC & biceps tendon repair, contributing to functional deficits. Patient demonstrated fatigue post treatment, exhibited good technique with therapeutic exercises, and would benefit from continued PT for cont'd shld strengthening for good AROM.      Plan: Continue per plan of care.  Progress treatment as tolerated.       Precautions: L shoulder RTC & biceps repair 2024     EPOC: 2024    Manuals           L shoulder PROM NS          L shoulder STM                                 Neuro Re-Ed           Scapular retraction iso           Mid row iso purple TB x30 5\"          Shld ext iso purple TB x30 5\"          Standing Trio  20x 5\" ea 2lb          Sidelying Trio 20x 5\" ea 1-2lb          OH med ball CW,CCW  2X30 ea           ER wall walk  X15 Peach TB          Ther Ex           Wand Ext & IR & OH flx  10x10\" flex 2lb          OH Wall walks PTB 5x          Elbow flex DB curl + press OH 20x 2lb          Shld flx OH TB X30 BTB          UT shrug/rolls/SB 2lb x20          Supine alphabets X2 2 lb          pulleys 3'          UT stretch           Prone row 2lb 20x          Prone ext 2lb 20x          Prone horz abd 1lb 20x                     Ther Activity           Pt education: pathoanatomy, nature of sxs, POC, HEP           UBE for shoulder ROM 3'/3' Fwd/bwd L2 4'/4' L2 fwd/bwd         Gait Training                                 Modalities                                        "

## 2024-07-22 ENCOUNTER — APPOINTMENT (OUTPATIENT)
Dept: PHYSICAL THERAPY | Facility: CLINIC | Age: 54
End: 2024-07-22
Payer: OTHER MISCELLANEOUS

## 2024-07-25 ENCOUNTER — APPOINTMENT (OUTPATIENT)
Dept: PHYSICAL THERAPY | Facility: CLINIC | Age: 54
End: 2024-07-25
Payer: OTHER MISCELLANEOUS

## 2024-07-31 ENCOUNTER — APPOINTMENT (OUTPATIENT)
Dept: PHYSICAL THERAPY | Facility: CLINIC | Age: 54
End: 2024-07-31
Payer: OTHER MISCELLANEOUS

## 2024-08-02 ENCOUNTER — EVALUATION (OUTPATIENT)
Dept: PHYSICAL THERAPY | Facility: CLINIC | Age: 54
End: 2024-08-02
Payer: OTHER MISCELLANEOUS

## 2024-08-02 DIAGNOSIS — M25.512 CHRONIC LEFT SHOULDER PAIN: Primary | ICD-10-CM

## 2024-08-02 DIAGNOSIS — G89.29 CHRONIC LEFT SHOULDER PAIN: Primary | ICD-10-CM

## 2024-08-02 PROCEDURE — 97110 THERAPEUTIC EXERCISES: CPT | Performed by: PHYSICAL THERAPIST

## 2024-08-02 PROCEDURE — 97530 THERAPEUTIC ACTIVITIES: CPT | Performed by: PHYSICAL THERAPIST

## 2024-08-02 PROCEDURE — 97164 PT RE-EVAL EST PLAN CARE: CPT | Performed by: PHYSICAL THERAPIST

## 2024-08-02 NOTE — LETTER
2024    Johnson Jason MD  [address] [place] [address]    Patient: Claude Priest   YOB: 1970   Date of Visit: 2024     Encounter Diagnosis     ICD-10-CM    1. Chronic left shoulder pain  M25.512     G89.29           Dear Dr. Jason:    Thank you for your recent referral of Claude Priest. Please review the attached evaluation summary from Claude's recent visit.     Please verify that you agree with the plan of care by signing the attached order.     If you have any questions or concerns, please do not hesitate to call.     I sincerely appreciate the opportunity to share in the care of one of your patients and hope to have another opportunity to work with you in the near future.       Sincerely,    Saravanan Jaramillo, PT      Referring Provider:      I certify that I have read the below Plan of Care and certify the need for these services furnished under this plan of treatment while under my care.                    Johnson Jason MD  [address] [place] [address]  Via Fax: [phone]          PT Re-Evaluation     Today's date: 2024  Patient name: Claude Priest  : 1970  MRN: [id number]  Referring provider: Johnson Jason MD  Dx:   Encounter Diagnosis     ICD-10-CM    1. Chronic left shoulder pain  M25.512     G89.29         Assessment  Assessment details: Claude Priest is a 54 y.o. female seen in outpatient physical therapy at [facility] with complaints of L shoulder pain, stiffness and weakness.  She has been treated for decreased L shoulder range of motion, decreased strength, limited flexibility, poor postural awareness, poor body mechanics, decreased tolerance to activity and decreased functional mobility due to Chronic left shoulder pain  (primary encounter diagnosis).  Re-evaluation was performed to assess if she would benefit from skilled PT services in order to address these deficits and reach maximum level of function.  Thank  you for the referral!    Impairments: abnormal or restricted ROM, activity intolerance, impaired physical strength and pain with function    Symptom irritability: moderateUnderstanding of Dx/Px/POC: good   Prognosis: good    Goals  STG  1. Independent with HEP in 2 weeks      Goal met  2. Decrease pain at worst by 50% in 2 weeks    Slow progress       LTG  1. Increase L shoulder strength in all planes to 5/5 in 4 weeks  Good progress  2. 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   Patient Goals  Patient goals for therapy: return to work, independence with ADLs/IADLs, increased strength, increased motion and decreased pain    Pain  Current pain ratin  At best pain ratin  At worst pain ratin      5/10      Location: L lateral delt  Quality: dull ache and sharp  Relieving factors: ice  Aggravating factors: overhead activity and lifting  Progression: worsening      Diagnostic Tests  X-ray: abnormal  MRI studies: abnormal      Objective     Active Range of Motion      Left Shoulder   Flexion: 105 deg painful   130 degrees tight  Extension: 60 deg    60 degrees  Abduction:  90 deg    105 degrees  External rotation 0°: 45 deg   55 degrees  External rotation BTH: C4-5   C5  Internal rotation BTB:  L2-3   L1    Right Shoulder   Normal active range of motion    Strength/Myotome Testing     Left Shoulder     Planes of Motion   Flexion: 3+    4  Extension: 3+  "    4+  Abduction: 3+     4  External rotation at 45°: 3-   4  Internal rotation at 0°: 3+   4+  Horizontal abduction: 3-   4    Isolated Muscles   Lower trapezius: 3   4  Middle trapezius: 3    4    Right Shoulder   Normal muscle strength    Precautions: L shoulder RTC & biceps repair 4/23/2024     EPOC: 10/2/2024      Manuals 8/2          L shoulder PROM NS          L shoulder STM                                 Neuro Re-Ed           Scapular retraction iso           Mid row iso purple TB x30 5\"          Shld ext iso purple TB x30 5\"          Standing Trio  20x 5\" ea 2lb          Sidelying Trio 20x 5\" ea 1-2lb          OH med ball CW,CCW  2X30 ea           ER wall walk  X15 [place] TB          Ther Ex           Wand Ext & IR & OH flx  10x10\" flex 2lb          OH Wall walks PTB 5x          Elbow flex DB curl + press OH 20x 2lb          Shld flx OH TB X30 BTB          UT shrug/rolls/SB 2lb x20          Supine alphabets X2 2 lb          pulleys 3'          UT stretch           Prone row 2lb 20x          Prone ext 2lb 20x          Prone horz abd 1lb 20x                     Ther Activity           Pt education: pathoanatomy, nature of sxs, POC, HEP           UBE for shoulder ROM 3'/3' Fwd/bwd L2          Gait Training                                 Modalities                                                     "

## 2024-08-02 NOTE — PROGRESS NOTES
PT Re-Evaluation     Today's date: 2024  Patient name: Claude Priest  : 1970  MRN: [id number]  Referring provider: Johnson Jason MD  Dx:   Encounter Diagnosis     ICD-10-CM    1. Chronic left shoulder pain  M25.512     G89.29         Assessment  Assessment details: Claude Priest is a 54 y.o. female seen in outpatient physical therapy at [facility] with complaints of L shoulder pain, stiffness and weakness.  She has been treated for decreased L shoulder range of motion, decreased strength, limited flexibility, poor postural awareness, poor body mechanics, decreased tolerance to activity and decreased functional mobility due to Chronic left shoulder pain  (primary encounter diagnosis).  Re-evaluation was performed to assess if she would benefit from skilled PT services in order to address these deficits and reach maximum level of function.  Thank you for the referral!    Impairments: abnormal or restricted ROM, activity intolerance, impaired physical strength and pain with function    Symptom irritability: moderateUnderstanding of Dx/Px/POC: good   Prognosis: good    Goals  STG  1. Independent with HEP in 2 weeks      Goal met  2. Decrease pain at worst by 50% in 2 weeks    Slow progress       LTG  1. Increase L shoulder strength in all planes to 5/5 in 4 weeks  Good progress  2. 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   Patient  "Goals  Patient goals for therapy: return to work, independence with ADLs/IADLs, increased strength, increased motion and decreased pain    Pain  Current pain ratin  At best pain ratin  At worst pain ratin      5/10      Location: L lateral delt  Quality: dull ache and sharp  Relieving factors: ice  Aggravating factors: overhead activity and lifting  Progression: worsening      Diagnostic Tests  X-ray: abnormal  MRI studies: abnormal      Objective     Active Range of Motion      Left Shoulder   Flexion: 105 deg painful   130 degrees tight  Extension: 60 deg    60 degrees  Abduction:  90 deg    105 degrees  External rotation 0°: 45 deg   55 degrees  External rotation BTH: C4-5   C5  Internal rotation BTB:  L2-3   L1    Right Shoulder   Normal active range of motion    Strength/Myotome Testing     Left Shoulder     Planes of Motion   Flexion: 3+    4  Extension: 3+     4+  Abduction: 3+     4  External rotation at 45°: 3-   4  Internal rotation at 0°: 3+   4+  Horizontal abduction: 3-   4    Isolated Muscles   Lower trapezius: 3   4  Middle trapezius: 3    4    Right Shoulder   Normal muscle strength    Precautions: L shoulder RTC & biceps repair 2024     EPOC: 10/2/2024      Manuals           L shoulder PROM NS          L shoulder STM                                 Neuro Re-Ed           Scapular retraction iso           Mid row iso purple TB x30 5\"          Shld ext iso purple TB x30 5\"          Standing Trio  20x 5\" ea 2lb          Sidelying Trio 20x 5\" ea 1-2lb          OH med ball CW,CCW  2X30 ea           ER wall walk  X15 [place] TB          Ther Ex           Wand Ext & IR & OH flx  10x10\" flex 2lb          OH Wall walks PTB 5x          Elbow flex DB curl + press OH 20x 2lb          Shld flx OH TB X30 BTB          UT shrug/rolls/SB 2lb x20          Supine alphabets X2 2 lb          pulleys 3'          UT stretch           Prone row 2lb 20x          Prone ext 2lb 20x          Prone horz abd 1lb " 20x                     Ther Activity           Pt education: pathoanatomy, nature of sxs, POC, HEP           UBE for shoulder ROM 3'/3' Fwd/bwd L2          Gait Training                                 Modalities

## 2024-08-05 ENCOUNTER — APPOINTMENT (OUTPATIENT)
Dept: PHYSICAL THERAPY | Facility: CLINIC | Age: 54
End: 2024-08-05
Payer: OTHER MISCELLANEOUS

## 2024-08-07 ENCOUNTER — EVALUATION (OUTPATIENT)
Dept: PHYSICAL THERAPY | Facility: CLINIC | Age: 54
End: 2024-08-07
Payer: OTHER MISCELLANEOUS

## 2024-08-07 DIAGNOSIS — M25.512 CHRONIC LEFT SHOULDER PAIN: Primary | ICD-10-CM

## 2024-08-07 DIAGNOSIS — G89.29 CHRONIC LEFT SHOULDER PAIN: Primary | ICD-10-CM

## 2024-08-07 PROCEDURE — 97530 THERAPEUTIC ACTIVITIES: CPT | Performed by: PHYSICAL THERAPIST

## 2024-08-07 PROCEDURE — 97110 THERAPEUTIC EXERCISES: CPT | Performed by: PHYSICAL THERAPIST

## 2024-08-07 PROCEDURE — 97112 NEUROMUSCULAR REEDUCATION: CPT | Performed by: PHYSICAL THERAPIST

## 2024-08-07 NOTE — PROGRESS NOTES
"Daily Note     Today's date: 2024  Patient name: Claude Priest  : 1970  MRN: [id number]  Referring provider: Johnson Jason MD  Dx:   Encounter Diagnosis     ICD-10-CM    1. Chronic left shoulder pain  M25.512     G89.29           Subjective: Compliant with HEP, no questions regarding POC, motivated to continue PT, feeling a bit better       Objective: See treatment diary below      Assessment: Patient demonstrates improving left shoulder stability & endurance this session.  Patient completed today's session without increase in pain in L shoulder.  Future sessions should continue to progress mobility & stability in LUE as able. Tolerated treatment well with progression of treatment program as noted below requiring verbal and tactile cues from PT for safe execution of therapeutic exercise. Patient demonstrated fatigue post treatment, exhibited good technique with therapeutic exercises, and would benefit from continued PT    Plan: Progress treatment as tolerated.       Precautions: L shoulder RTC & biceps repair 2024     EPOC: 10/2/2024      Manuals           L shoulder PROM NS          L shoulder STM                                 Neuro Re-Ed           Scapular retraction iso           Mid row iso purple TB x30 5\"          Shld ext iso purple TB x30 5\"          Standing Trio  20x 5\" ea 2lb          Sidelying Trio 20x 5\" ea 1-2lb          OH med ball CW,CCW  2X30 ea           ER wall walk  X15 [place] TB          Ther Ex           Wand Ext & IR & OH flx  10x10\" flex 2lb          OH Wall walks PTB 5x          Elbow flex DB curl + press OH 20x 2lb          Shld flx OH TB X30 BTB          UT shrug/rolls/SB 2lb x20          Supine alphabets X2 2 lb          pulleys 3'          UT stretch           Prone row 2lb 20x          Prone ext 2lb 20x          Prone horz abd 1lb 20x                     Ther Activity           Pt education: pathoanatomy, nature of sxs, POC, HEP           UBE for shoulder ROM " 3'/3' Fwd/bwd L2          Gait Training                                 Modalities                                   Prone horz abd 1lb 20x                     Ther Activity           Pt education: pathoanatomy, nature of sxs, POC, HEP           UBE for shoulder ROM 4'/4' L2 fwd/bwd             Gait Training                                 Modalities

## 2024-09-09 ENCOUNTER — EVALUATION (OUTPATIENT)
Dept: PHYSICAL THERAPY | Facility: CLINIC | Age: 54
End: 2024-09-09
Payer: OTHER MISCELLANEOUS

## 2024-09-09 DIAGNOSIS — S46.012D STRAIN OF TENDON OF LEFT ROTATOR CUFF, SUBSEQUENT ENCOUNTER: Primary | ICD-10-CM

## 2024-09-09 PROCEDURE — 97110 THERAPEUTIC EXERCISES: CPT | Performed by: PHYSICAL THERAPIST

## 2024-09-09 PROCEDURE — 97164 PT RE-EVAL EST PLAN CARE: CPT | Performed by: PHYSICAL THERAPIST

## 2024-09-09 PROCEDURE — 97112 NEUROMUSCULAR REEDUCATION: CPT | Performed by: PHYSICAL THERAPIST

## 2024-09-09 NOTE — LETTER
2024    Johnson Jason MD  [address] [place] [address]    Patient: Claude Priest   YOB: 1970   Date of Visit: 2024     Encounter Diagnosis     ICD-10-CM    1. Strain of tendon of left rotator cuff, subsequent encounter  S46.012D           Dear Dr. Jason:    Thank you for your recent referral of Claude Priest. Please review the attached evaluation summary from Claude's recent visit.     Please verify that you agree with the plan of care by signing the attached order.     If you have any questions or concerns, please do not hesitate to call.     I sincerely appreciate the opportunity to share in the care of one of your patients and hope to have another opportunity to work with you in the near future.       Sincerely,    Saravanan Jaramillo, PT      Referring Provider:      I certify that I have read the below Plan of Care and certify the need for these services furnished under this plan of treatment while under my care.                    Johnson Jason MD  [address] [place] [address]  Via Fax: [phone]          PT Evaluation     Today's date: 2024  Patient name: Claude Priest  : 1970  MRN: [id number]  Referring provider: An Owusu PA-C  Dx:   Encounter Diagnosis     ICD-10-CM    1. Chronic left shoulder pain                Assessment  Assessment details: Claude Priest is a 54 y.o. female seen in outpatient physical therapy at [facility] with complaints of L shoulder pain, stiffness and weakness.  She has been treated for decreased L shoulder range of motion, decreased strength, limited flexibility, poor postural awareness, poor body mechanics, decreased tolerance to activity and decreased functional mobility due to Chronic left shoulder pain  (primary encounter diagnosis).  Re-evaluation was performed to assess if she would benefit from skilled PT services in order to address these deficits and reach maximum level of  function.  Thank you for the referral!    Impairments: abnormal or restricted ROM, activity intolerance, impaired physical strength and pain with function    Symptom irritability: moderateUnderstanding of Dx/Px/POC: good   Prognosis: good    Goals  STG  1. Independent with HEP in 2 weeks       2. Decrease pain at worst by 50% in 2 weeks           LTG  1. Increase L shoulder strength in all planes to 5/5 in 4 weeks    2. 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   Patient Goals  Patient goals for therapy: return to work, independence with ADLs/IADLs, increased strength, increased motion and decreased pain    Pain  Current pain ratin  At best pain ratin  At worst pain ratin         Location: L lateral delt  Quality: dull ache and sharp  Relieving factors: ice  Aggravating factors: overhead activity and lifting  Progression: worsening      Diagnostic Tests  X-ray: abnormal  MRI studies: abnormal      Objective     Active Range of Motion      Left Shoulder   Flexion: 150 deg painful     Extension: 60 deg      Abduction:  155 deg      External rotation 0°: 45 deg     External rotation BTH: C4-5     Internal rotation BTB:  L2-3       Right Shoulder   Normal active range of motion    Strength/Myotome Testing     Left Shoulder     Planes of Motion   Flexion: 3+     Extension: 4       Abduction: 4      External rotation at 45°: 4  Internal rotation at 0°: 4     Horizontal abduction: 4     Isolated  "Muscles   Lower trapezius: 4    Middle trapezius: 4        Right Shoulder   Normal muscle strength    Precautions: L shoulder RTC & biceps repair 4/23/2024     EPOC: 11/1/2024      Manuals 9/9          L shoulder PROM           L shoulder STM                                 Neuro Re-Ed           Scapular retraction iso           Mid row iso 45lb CC 3x10          LPD iso 45lb CC 3x10           Standing Trio  20x 5\" ea 2lb          Sidelying Trio 20x 5\" ea 1-2lb          OH med ball CW,CCW  2X30 ea           ER wall walk  X15 [place] TB          Ther Ex           Wand Ext & IR & OH flx  10x10\" flex 2lb          OH Wall walks PTB 5x          Elbow flex DB curl + press OH 20x 2lb          Shld flx OH TB X30 BTB          UT shrug/rolls/SB 2lb x20          Supine alphabets X2 2 lb          pulleys 3'          UT stretch           Prone row 2lb 20x          Prone ext 2lb 20x          Prone horz abd 1lb 20x                     Ther Activity           Pt education: pathoanatomy, nature of sxs, POC, HEP NS          UBE for shoulder ROM 3'/3' Fwd/bwd L2          Gait Training                                 Modalities                                                 "

## 2024-09-09 NOTE — LETTER
2024    An Owusu PA-C  [address]  [facility] [address]    Patient: Claude Priest   YOB: 1970   Date of Visit: 2024     Encounter Diagnosis     ICD-10-CM    1. Strain of tendon of left rotator cuff, subsequent encounter  S46.012D           Dear Dr. Owusu:    Thank you for your recent referral of Claude Priest. Please review the attached evaluation summary from Claude's recent visit.     Please verify that you agree with the plan of care by signing the attached order.     If you have any questions or concerns, please do not hesitate to call.     I sincerely appreciate the opportunity to share in the care of one of your patients and hope to have another opportunity to work with you in the near future.       Sincerely,    Saravanan Jaramillo, PT      Referring Provider:      I certify that I have read the below Plan of Care and certify the need for these services furnished under this plan of treatment while under my care.                    An Owusu PA-C  [address]  [facility] [address]  Via Fax: [phone]          PT Evaluation     Today's date: 2024  Patient name: Claude Priest  : 1970  MRN: [id number]  Referring provider: An Owusu PA-C  Dx:   Encounter Diagnosis     ICD-10-CM    1. Chronic left shoulder pain                Assessment  Assessment details: Claude Priest is a 54 y.o. female seen in outpatient physical therapy at [facility] with complaints of L shoulder pain, stiffness and weakness.  She has been treated for decreased L shoulder range of motion, decreased strength, limited flexibility, poor postural awareness, poor body mechanics, decreased tolerance to activity and decreased functional mobility due to Chronic left shoulder pain  (primary encounter diagnosis).  Re-evaluation was performed to assess if she would benefit from skilled PT services in order to address these deficits and reach  maximum level of function.  Thank you for the referral!    Impairments: abnormal or restricted ROM, activity intolerance, impaired physical strength and pain with function    Symptom irritability: moderateUnderstanding of Dx/Px/POC: good   Prognosis: good    Goals  STG  1. Independent with HEP in 2 weeks       2. Decrease pain at worst by 50% in 2 weeks           LTG  1. Increase L shoulder strength in all planes to 5/5 in 4 weeks    2. 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   Patient Goals  Patient goals for therapy: return to work, independence with ADLs/IADLs, increased strength, increased motion and decreased pain    Pain  Current pain ratin  At best pain ratin  At worst pain ratin         Location: L lateral delt  Quality: dull ache and sharp  Relieving factors: ice  Aggravating factors: overhead activity and lifting  Progression: worsening      Diagnostic Tests  X-ray: abnormal  MRI studies: abnormal      Objective     Active Range of Motion      Left Shoulder   Flexion: 150 deg painful     Extension: 60 deg      Abduction:  155 deg      External rotation 0°: 45 deg     External rotation BTH: C4-5     Internal rotation BTB:  L2-3       Right Shoulder   Normal active range of motion    Strength/Myotome Testing     Left Shoulder     Planes of Motion   Flexion: 3+     Extension: 4       Abduction: 4      External rotation at 45°: 4  Internal rotation at 0°: 4     Horizontal abduction:  "4     Isolated Muscles   Lower trapezius: 4    Middle trapezius: 4        Right Shoulder   Normal muscle strength    Precautions: L shoulder RTC & biceps repair 4/23/2024     EPOC: 11/1/2024      Manuals 9/9          L shoulder PROM           L shoulder STM                                 Neuro Re-Ed           Scapular retraction iso           Mid row iso 45lb CC 3x10          LPD iso 45lb CC 3x10           Standing Trio  20x 5\" ea 2lb          Sidelying Trio 20x 5\" ea 1-2lb          OH med ball CW,CCW  2X30 ea           ER wall walk  X15 [place] TB          Ther Ex           Wand Ext & IR & OH flx  10x10\" flex 2lb          OH Wall walks PTB 5x          Elbow flex DB curl + press OH 20x 2lb          Shld flx OH TB X30 BTB          UT shrug/rolls/SB 2lb x20          Supine alphabets X2 2 lb          pulleys 3'          UT stretch           Prone row 2lb 20x          Prone ext 2lb 20x          Prone horz abd 1lb 20x                     Ther Activity           Pt education: pathoanatomy, nature of sxs, POC, HEP NS          UBE for shoulder ROM 3'/3' Fwd/bwd L2          Gait Training                                 Modalities                                           "

## 2024-09-09 NOTE — PROGRESS NOTES
PT Evaluation     Today's date: 2024  Patient name: Claude Priest  : 1970  MRN: [id number]  Referring provider: An Owusu PA-C  Dx:   Encounter Diagnosis     ICD-10-CM    1. Chronic left shoulder pain                Assessment  Assessment details: Claude Priest is a 54 y.o. female seen in outpatient physical therapy at [facility] with complaints of L shoulder pain, stiffness and weakness.  She has been treated for decreased L shoulder range of motion, decreased strength, limited flexibility, poor postural awareness, poor body mechanics, decreased tolerance to activity and decreased functional mobility due to Chronic left shoulder pain  (primary encounter diagnosis).  Re-evaluation was performed to assess if she would benefit from skilled PT services in order to address these deficits and reach maximum level of function.  Thank you for the referral!    Impairments: abnormal or restricted ROM, activity intolerance, impaired physical strength and pain with function    Symptom irritability: moderateUnderstanding of Dx/Px/POC: good   Prognosis: good    Goals  STG  1. Independent with HEP in 2 weeks       2. Decrease pain at worst by 50% in 2 weeks           LTG  1. Increase L shoulder strength in all planes to 5/5 in 4 weeks    2. 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   Patient Goals  Patient goals for therapy: return to work, independence with ADLs/IADLs, increased  "strength, increased motion and decreased pain    Pain  Current pain ratin  At best pain ratin  At worst pain ratin         Location: L lateral delt  Quality: dull ache and sharp  Relieving factors: ice  Aggravating factors: overhead activity and lifting  Progression: worsening      Diagnostic Tests  X-ray: abnormal  MRI studies: abnormal      Objective     Active Range of Motion      Left Shoulder   Flexion: 150 deg painful     Extension: 60 deg      Abduction:  155 deg      External rotation 0°: 45 deg     External rotation BTH: C4-5     Internal rotation BTB:  L2-3       Right Shoulder   Normal active range of motion    Strength/Myotome Testing     Left Shoulder     Planes of Motion   Flexion: 3+     Extension: 4       Abduction: 4      External rotation at 45°: 4  Internal rotation at 0°: 4     Horizontal abduction: 4     Isolated Muscles   Lower trapezius: 4    Middle trapezius: 4        Right Shoulder   Normal muscle strength    Precautions: L shoulder RTC & biceps repair 2024     EPOC: 2024      Manuals           L shoulder PROM           L shoulder STM                                 Neuro Re-Ed           Scapular retraction iso           Mid row iso 45lb CC 3x10          LPD iso 45lb CC 3x10           Standing Trio  20x 5\" ea 2lb          Sidelying Trio 20x 5\" ea 1-2lb          OH med ball CW,CCW  2X30 ea           ER wall walk  X15 Peach TB          Ther Ex           Wand Ext & IR & OH flx  10x10\" flex 2lb          OH Wall walks PTB 5x          Elbow flex DB curl + press OH 20x 2lb          Shld flx OH TB X30 BTB          UT shrug/rolls/SB 2lb x20          Supine alphabets X2 2 lb          pulleys 3'          UT stretch           Prone row 2lb 20x          Prone ext 2lb 20x          Prone horz abd 1lb 20x                     Ther Activity           Pt education: pathoanatomy, nature of sxs, POC, HEP NS          UBE for shoulder ROM 3'/3' Fwd/bwd L2          Gait Training             "                     Modalities

## 2024-09-18 ENCOUNTER — APPOINTMENT (OUTPATIENT)
Dept: PHYSICAL THERAPY | Facility: CLINIC | Age: 54
End: 2024-09-18
Payer: OTHER MISCELLANEOUS

## 2024-09-25 ENCOUNTER — OFFICE VISIT (OUTPATIENT)
Dept: PHYSICAL THERAPY | Facility: CLINIC | Age: 54
End: 2024-09-25
Payer: OTHER MISCELLANEOUS

## 2024-09-25 DIAGNOSIS — S46.012D STRAIN OF TENDON OF LEFT ROTATOR CUFF, SUBSEQUENT ENCOUNTER: Primary | ICD-10-CM

## 2024-09-25 PROCEDURE — 97110 THERAPEUTIC EXERCISES: CPT

## 2024-09-25 PROCEDURE — 97112 NEUROMUSCULAR REEDUCATION: CPT

## 2024-09-25 PROCEDURE — 97530 THERAPEUTIC ACTIVITIES: CPT

## 2024-09-25 NOTE — PROGRESS NOTES
"Daily Note     Today's date: 2024  Patient name: Claude Priest  : 1970  MRN: [id number]  Referring provider: Johnson Jason MD  Dx:   Encounter Diagnosis     ICD-10-CM    1. Strain of tendon of left rotator cuff, subsequent encounter  S46.012D           Start Time: 1617  Stop Time: 1656  Total time in clinic (min): 39 minutes    Subjective: Patient reports pain has mostly abolished however, tends to get very sore after PT or after work.       Objective: See treatment diary below      Assessment: Patient tolerated treatment session well. Continued focusing on improving shoulder strength, stability, and endurance as outlined below. Patient exhibited a good performance with OH interventions but does demonstrate an early onset of fatigue with these activities. Onset of R shoulder pain (contralateral side) was reported with some exercises therefore, DB weight was decreased on that extremity for comfort. Good recruitment of periscapular muscles with prone series. Overall, patient is making steady progress towards goals and would benefit form continued PT to improve level of function.       Plan: Continue per POC. Increase reps/resistance as tolerated.      Precautions: L shoulder RTC & biceps repair 2024     EPOC: 10/2/2024    Manuals                L shoulder PROM                   L shoulder STM                                                           Neuro Re-Ed                   Scapular retraction iso                   Mid row iso 45lb CC 3x10 45lb CC 3x10             LPD iso 45lb CC 3x10   45lb CC 3x10                 Standing Trio  20x 5\" ea 2lb  20x 5\" ea 2lb               Sidelying Trio 20x 5\" ea 1-2lb  20x 5\" ea 1-2lb               OH med ball CW,CCW  2X30 ea   2X30 ea                ER wall walk  X15 Peach TB               Ther Ex                   Wand Ext & IR & OH flx  10x10\" flex 2lb  10x10\" flex 2lb               OH Wall walks PTB 5x                 Elbow flex DB curl + " press OH 20x 2lb  20x 2lb               Shld flx OH TB X30 BTB                 UT shrug/rolls/SB 2lb x20  2lb x20               Supine alphabets X2 2 lb  X2 2 lb               pulleys 3'  3'               UT stretch                   Prone row 2lb 20x  2lb 20x               Prone ext 2lb 20x  2lb 20x               Prone horz abd 1lb 20x  20x                                   Ther Activity                   Pt education: pathoanatomy, nature of sxs, POC, HEP NS                 UBE for shoulder ROM 3'/3' Fwd/bwd L2  3'/3' Fwd/bwd L2               Gait Training                                                           Modalities

## 2024-10-02 ENCOUNTER — OFFICE VISIT (OUTPATIENT)
Dept: PHYSICAL THERAPY | Facility: CLINIC | Age: 54
End: 2024-10-02
Payer: OTHER MISCELLANEOUS

## 2024-10-02 DIAGNOSIS — S46.012D STRAIN OF TENDON OF LEFT ROTATOR CUFF, SUBSEQUENT ENCOUNTER: Primary | ICD-10-CM

## 2024-10-02 PROCEDURE — 97140 MANUAL THERAPY 1/> REGIONS: CPT

## 2024-10-02 PROCEDURE — 97110 THERAPEUTIC EXERCISES: CPT

## 2024-10-02 PROCEDURE — 97112 NEUROMUSCULAR REEDUCATION: CPT

## 2024-10-02 NOTE — PROGRESS NOTES
"Daily Note     Today's date: 10/2/2024  Patient name: Claude Priest  : 1970  MRN: [id number]  Referring provider: Johnson Jason MD  Dx:   Encounter Diagnosis     ICD-10-CM    1. Strain of tendon of left rotator cuff, subsequent encounter  S46.012D                      Subjective: Pt reports her shld is getting better.  States she has pain in the AM      Objective: See treatment diary below      Assessment: Tolerated treatment well.  Pt tends to shrug anteriorly at rest or when having pain.  Patient would benefit from continued PT to work on good form w/ shld postural strengthening.  Pt required VC's for shld posture and safe execution.      Plan: Continue per plan of care.  Progress treatment as tolerated.       Precautions: L shoulder RTC & biceps repair 2024     EPOC: 10/2/2024    Manuals 9/9  9/25  10/2             L shoulder PROM                   L shoulder STM      8' jk                                                     Neuro Re-Ed                   Scapular retraction iso                   Mid row iso 45lb CC 3x10 45lb CC 3x10 45lb CC 3x10            LPD iso 45lb CC 3x10   45lb CC 3x10   45lb CC 3x10               Standing Trio  20x 5\" ea 2lb  20x 5\" ea 2lb  20x 5\" ea 2lb             Sidelying Trio 20x 5\" ea 1-2lb  20x 5\" ea 1-2lb  20x 5\" ea 1-2lb             OH med ball CW,CCW  2X30 ea   2X30 ea                ER wall walk  X15 Peach TB   X15 [place]ach TB            Ther Ex                   Wand Ext & IR & OH flx  10x10\" flex 2lb  10x10\" flex 2lb  10x10\" flex 2lb             OH Wall walks PTB 5x    seea jair             Elbow flex DB curl + press OH 20x 2lb  20x 2lb  20x 2lb             Shld flx OH TB X30 BTB    X30 BTB             UT shrug/rolls/SB 2lb x20  2lb x20  np             Supine alphabets X2 2 lb  X2 2 lb  X2 2 lb             pulleys 3'  3'               UT stretch                   Prone row 2lb 20x  2lb 20x  2lb 20x             Prone ext 2lb 20x  2lb 20x  2lb 20x           "   Prone horz abd 1lb 20x  20x  2lb 20x                                 Ther Activity                   Pt education: pathoanatomy, nature of sxs, POC, HEP NS                 UBE for shoulder ROM 3'/3' Fwd/bwd L2  3'/3' Fwd/bwd L2  3'/3' Fwd/bwd L2             Gait Training                                                           Modalities

## 2024-10-09 ENCOUNTER — OFFICE VISIT (OUTPATIENT)
Dept: PHYSICAL THERAPY | Facility: CLINIC | Age: 54
End: 2024-10-09
Payer: OTHER MISCELLANEOUS

## 2024-10-09 DIAGNOSIS — S46.012D STRAIN OF TENDON OF LEFT ROTATOR CUFF, SUBSEQUENT ENCOUNTER: Primary | ICD-10-CM

## 2024-10-09 PROCEDURE — 97110 THERAPEUTIC EXERCISES: CPT | Performed by: PHYSICAL THERAPIST

## 2024-10-09 PROCEDURE — 97112 NEUROMUSCULAR REEDUCATION: CPT | Performed by: PHYSICAL THERAPIST

## 2024-10-09 NOTE — PROGRESS NOTES
"Daily Note     Today's date: 10/9/2024  Patient name: Claude Priest  : 1970  MRN: [id number]  Referring provider: Johnson Jason MD  Dx:   Encounter Diagnosis     ICD-10-CM    1. Strain of tendon of left rotator cuff, subsequent encounter  S46.012D                        Subjective: Compliant with HEP, no questions regarding POC, motivated to continue PT      Objective: See treatment diary below      Assessment: Tolerated treatment well.  Patient would benefit from continued PT to work on good form w/ shld postural strengthening.  Pt required VC's for shld posture and safe execution.      Plan: Continue per plan of care.  Progress treatment as tolerated.       Precautions: L shoulder RTC & biceps repair 2024     EPOC: 10/2/2024    Manuals 9/9  9/25  10/2  10/9           L shoulder PROM                   L shoulder STM      8' jk  NS 5'                                                   Neuro Re-Ed                   Scapular retraction iso                   Mid row iso 45lb CC 3x10 45lb CC 3x10 45lb CC 3x10 45lb CC           LPD iso 45lb CC 3x10   45lb CC 3x10   45lb CC 3x10  45lb CC 3x10             Standing Trio  20x 5\" ea 2lb  20x 5\" ea 2lb  20x 5\" ea 2lb  20x 5\" ea 2lb           Sidelying Trio 20x 5\" ea 1-2lb  20x 5\" ea 1-2lb  20x 5\" ea 1-2lb  20x 5\" ea 2lb           OH med ball CW,CCW  2X30 ea   2X30 ea                ER wall walk  X15 Peach TB   X15 [place]ach TB nv           Ther Ex                   Wand Ext & IR & OH flx  10x10\" flex 2lb  10x10\" flex 2lb  10x10\" flex 2lb  10x10\" flex 2lb            OH Wall walks PTB 5x    seea jair             Elbow flex DB curl + press OH 20x 2lb  20x 2lb  20x 2lb  20x 2lb           Shld flx OH TB X30 BTB    X30 BTB  x30 PTB           UT shrug/rolls/SB 2lb x20  2lb x20  np             Supine alphabets X2 2 lb  X2 2 lb  X2 2 lb  x2 2lb           pulleys 3'  3'               UT stretch                   Prone row 2lb 20x  2lb 20x  2lb 20x  2lb 20x           Prone " ext 2lb 20x  2lb 20x  2lb 20x  2lb 20x           Prone horz abd 1lb 20x  20x  2lb 20x  2lb 20x                               Ther Activity                   Pt education: pathoanatomy, nature of sxs, POC, HEP NS      NS           UBE for shoulder ROM 3'/3' Fwd/bwd L2  3'/3' Fwd/bwd L2  3'/3' Fwd/bwd L2 4'/4' FWD/BWD L2           Gait Training                                                           Modalities

## 2024-10-16 ENCOUNTER — OFFICE VISIT (OUTPATIENT)
Dept: PHYSICAL THERAPY | Facility: CLINIC | Age: 54
End: 2024-10-16
Payer: OTHER MISCELLANEOUS

## 2024-10-16 DIAGNOSIS — S46.012D STRAIN OF TENDON OF LEFT ROTATOR CUFF, SUBSEQUENT ENCOUNTER: Primary | ICD-10-CM

## 2024-10-16 PROCEDURE — 97110 THERAPEUTIC EXERCISES: CPT | Performed by: PHYSICAL THERAPIST

## 2024-10-16 PROCEDURE — 97112 NEUROMUSCULAR REEDUCATION: CPT | Performed by: PHYSICAL THERAPIST

## 2024-10-16 NOTE — PROGRESS NOTES
"Daily Note     Today's date: 10/16/2024  Patient name: Claude Priest  : 1970  MRN: [id number]  Referring provider: Johnson Jason MD  Dx:   Encounter Diagnosis     ICD-10-CM    1. Strain of tendon of left rotator cuff, subsequent encounter  S46.012D                        Subjective: Compliant with HEP, no questions regarding POC, motivated to continue PT      Objective: See treatment diary below      Assessment: Tolerated treatment well.  Patient would benefit from continued PT to work on good form w/ shld postural strengthening.  Pt required VC's for shld posture and safe execution.      Plan: Continue per plan of care.  Progress treatment as tolerated.       Precautions: L shoulder RTC & biceps repair 2024         Manuals 9/9  9/25  10/2  10/16           L shoulder PROM                   L shoulder STM      8' jk  NS 5'                                                   Neuro Re-Ed                   Scapular retraction iso                   Mid row iso 45lb CC 3x10 45lb CC 3x10 45lb CC 3x10 45lb CC           LPD iso 45lb CC 3x10   45lb CC 3x10   45lb CC 3x10  45lb CC 3x10             Standing Trio  20x 5\" ea 2lb  20x 5\" ea 2lb  20x 5\" ea 2lb  20x 5\" ea 2lb           Sidelying Trio 20x 5\" ea 1-2lb  20x 5\" ea 1-2lb  20x 5\" ea 1-2lb  20x 5\" ea 2lb           OH med ball CW,CCW  2X30 ea   2X30 ea                ER wall walk  X15 Peach TB   X15 [place]ach TB nv           Ther Ex                   Wand Ext & IR & OH flx  10x10\" flex 2lb  10x10\" flex 2lb  10x10\" flex 2lb  10x10\" flex 2lb            OH Wall walks PTB 5x    seea jair             Elbow flex DB curl + press OH 20x 2lb  20x 2lb  20x 2lb  20x 2lb           Shld flx OH TB X30 BTB    X30 BTB  x30 PTB           UT shrug/rolls/SB 2lb x20  2lb x20  np             Supine alphabets X2 2 lb  X2 2 lb  X2 2 lb  x2 2lb           pulleys 3'  3'               UT stretch                   Prone row 2lb 20x  2lb 20x  2lb 20x  2lb 20x           Prone ext 2lb 20x  " 2lb 20x  2lb 20x  2lb 20x           Prone horz abd 1lb 20x  20x  2lb 20x  2lb 20x                               Ther Activity                   Pt education: pathoanatomy, nature of sxs, POC, HEP NS      NS           UBE for shoulder ROM 3'/3' Fwd/bwd L2  3'/3' Fwd/bwd L2  3'/3' Fwd/bwd L2 4'/4' FWD/BWD L2           Gait Training                                                           Modalities

## 2024-10-23 ENCOUNTER — APPOINTMENT (OUTPATIENT)
Dept: PHYSICAL THERAPY | Facility: CLINIC | Age: 54
End: 2024-10-23
Payer: OTHER MISCELLANEOUS

## 2024-10-30 ENCOUNTER — APPOINTMENT (OUTPATIENT)
Dept: PHYSICAL THERAPY | Facility: CLINIC | Age: 54
End: 2024-10-30
Payer: OTHER MISCELLANEOUS

## 2025-07-24 ENCOUNTER — ANNUAL EXAM (OUTPATIENT)
Dept: OBGYN CLINIC | Facility: CLINIC | Age: 55
End: 2025-07-24
Payer: COMMERCIAL

## 2025-07-24 VITALS
BODY MASS INDEX: 30.09 KG/M2 | SYSTOLIC BLOOD PRESSURE: 138 MMHG | DIASTOLIC BLOOD PRESSURE: 82 MMHG | WEIGHT: 180.6 LBS | HEIGHT: 65 IN

## 2025-07-24 DIAGNOSIS — Z01.419 ROUTINE GYNECOLOGICAL EXAMINATION: Primary | ICD-10-CM

## 2025-07-24 DIAGNOSIS — Z12.4 SCREENING FOR MALIGNANT NEOPLASM OF THE CERVIX: ICD-10-CM

## 2025-07-24 DIAGNOSIS — Z12.31 ENCOUNTER FOR SCREENING MAMMOGRAM FOR BREAST CANCER: ICD-10-CM

## 2025-07-24 DIAGNOSIS — Z80.3 FAMILY HISTORY OF BREAST CANCER IN FEMALE: ICD-10-CM

## 2025-07-24 PROCEDURE — 99386 PREV VISIT NEW AGE 40-64: CPT | Performed by: OBSTETRICS & GYNECOLOGY

## 2025-07-24 RX ORDER — LISINOPRIL AND HYDROCHLOROTHIAZIDE 10; 12.5 MG/1; MG/1
1 TABLET ORAL DAILY
COMMUNITY

## 2025-07-27 LAB
CLINICAL INFO: NORMAL
CYTO CVX: NORMAL
CYTOLOGY CMNT CVX/VAG CYTO-IMP: NORMAL
DATE PREVIOUS BX: NORMAL
HPV E6+E7 MRNA CVX QL NAA+PROBE: NOT DETECTED
LMP START DATE: NORMAL
SL AMB PREV. PAP:: NORMAL
SPECIMEN SOURCE CVX/VAG CYTO: NORMAL

## 2025-08-19 DIAGNOSIS — Z12.31 ENCOUNTER FOR SCREENING MAMMOGRAM FOR MALIGNANT NEOPLASM OF BREAST: ICD-10-CM
